# Patient Record
Sex: MALE | Race: BLACK OR AFRICAN AMERICAN | Employment: UNEMPLOYED | URBAN - METROPOLITAN AREA
[De-identification: names, ages, dates, MRNs, and addresses within clinical notes are randomized per-mention and may not be internally consistent; named-entity substitution may affect disease eponyms.]

---

## 2017-01-19 ENCOUNTER — HOSPITAL ENCOUNTER (EMERGENCY)
Age: 22
Discharge: HOME | End: 2017-01-19
Attending: EMERGENCY MEDICINE
Payer: SELF-PAY

## 2017-01-19 VITALS
TEMPERATURE: 98.1 F | OXYGEN SATURATION: 98 % | RESPIRATION RATE: 16 BRPM | WEIGHT: 250.44 LBS | HEART RATE: 65 BPM | BODY MASS INDEX: 33.97 KG/M2 | DIASTOLIC BLOOD PRESSURE: 80 MMHG | SYSTOLIC BLOOD PRESSURE: 141 MMHG

## 2017-01-19 DIAGNOSIS — S16.1XXA CERVICAL STRAIN, INITIAL ENCOUNTER: Primary | ICD-10-CM
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
## 2017-01-19 DIAGNOSIS — S39.012A LUMBAR STRAIN, INITIAL ENCOUNTER: ICD-10-CM

## 2017-01-19 PROCEDURE — 99282 EMERGENCY DEPT VISIT SF MDM: CPT

## 2017-01-19 RX ORDER — CYCLOBENZAPRINE HCL 10 MG
10 TABLET ORAL
Qty: 20 TAB | Refills: 0 | Status: SHIPPED | OUTPATIENT
Start: 2017-01-19 | End: 2017-04-24

## 2017-01-19 RX ORDER — HYDROCODONE BITARTRATE AND ACETAMINOPHEN 5; 325 MG/1; MG/1
1 TABLET ORAL
Qty: 10 TAB | Refills: 0 | Status: SHIPPED | OUTPATIENT
Start: 2017-01-19 | End: 2017-04-24

## 2017-01-19 RX ORDER — NAPROXEN 500 MG/1
500 TABLET ORAL 2 TIMES DAILY WITH MEALS
Qty: 20 TAB | Refills: 0 | Status: SHIPPED | OUTPATIENT
Start: 2017-01-19 | End: 2017-01-29
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
## 2017-04-24 ENCOUNTER — HOSPITAL ENCOUNTER (EMERGENCY)
Age: 22
Discharge: HOME | End: 2017-04-24
Attending: EMERGENCY MEDICINE
Payer: SELF-PAY

## 2017-04-24 ENCOUNTER — APPOINTMENT (OUTPATIENT)
Dept: GENERAL RADIOLOGY | Age: 22
End: 2017-04-24
Attending: EMERGENCY MEDICINE
Payer: SELF-PAY

## 2017-04-24 VITALS
DIASTOLIC BLOOD PRESSURE: 81 MMHG | OXYGEN SATURATION: 91 % | BODY MASS INDEX: 34.19 KG/M2 | HEART RATE: 60 BPM | HEIGHT: 73 IN | SYSTOLIC BLOOD PRESSURE: 138 MMHG | TEMPERATURE: 97.9 F | WEIGHT: 257.94 LBS | RESPIRATION RATE: 16 BRPM

## 2017-04-24 DIAGNOSIS — M79.672 ACUTE FOOT PAIN, LEFT: ICD-10-CM
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
## 2017-04-24 PROCEDURE — 73630 X-RAY EXAM OF FOOT: CPT

## 2017-04-24 PROCEDURE — 99284 EMERGENCY DEPT VISIT MOD MDM: CPT

## 2017-04-24 PROCEDURE — 74011250637 HC RX REV CODE- 250/637: Performed by: EMERGENCY MEDICINE

## 2017-04-24 RX ORDER — IBUPROFEN 600 MG/1
600 TABLET ORAL
Status: COMPLETED | OUTPATIENT
Start: 2017-04-24 | End: 2017-04-24

## 2017-04-24 RX ORDER — IBUPROFEN 600 MG/1
TABLET ORAL
Status: DISPENSED
Start: 2017-04-24 | End: 2017-04-24

## 2017-04-24 RX ORDER — OXYCODONE AND ACETAMINOPHEN 5; 325 MG/1; MG/1
2 TABLET ORAL
Status: DISCONTINUED | OUTPATIENT
Start: 2017-04-24 | End: 2017-04-24

## 2017-04-24 RX ADMIN — IBUPROFEN 600 MG: 600 TABLET, FILM COATED ORAL at 01:08

## 2017-04-24 NOTE — DISCHARGE INSTRUCTIONS
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
## 2017-04-24 NOTE — ED NOTES
ACE wrap placed on pt's left foot. CSM intact. Pt provided with crutches and educated on use. Pt ambulatory out of department with cruthes; declined wheelchair. Dr. Elkin Robison reviewed discharge instructions with the patient. The patient verbalized understanding.

## 2017-05-17 ENCOUNTER — HOSPITAL ENCOUNTER (EMERGENCY)
Age: 22
Discharge: HOME | End: 2017-05-17
Attending: EMERGENCY MEDICINE
Payer: SELF-PAY

## 2017-05-17 VITALS
RESPIRATION RATE: 16 BRPM | BODY MASS INDEX: 33.13 KG/M2 | HEIGHT: 73 IN | HEART RATE: 72 BPM | DIASTOLIC BLOOD PRESSURE: 86 MMHG | SYSTOLIC BLOOD PRESSURE: 149 MMHG | OXYGEN SATURATION: 99 % | WEIGHT: 250 LBS | TEMPERATURE: 98.4 F

## 2017-05-17 DIAGNOSIS — R07.89 MUSCULOSKELETAL CHEST PAIN: Primary | ICD-10-CM

## 2017-05-17 PROCEDURE — 93005 ELECTROCARDIOGRAM TRACING: CPT

## 2017-05-17 PROCEDURE — 99283 EMERGENCY DEPT VISIT LOW MDM: CPT

## 2017-05-17 NOTE — LETTER
[address] 
[facility] EMERGENCY DEPT 
[address] [place] [address] [id number] [phone] Work/School Note Date: 5/17/2017 To Whom It May concern: 
 
Dakotah Tobin was seen and treated today in the emergency room by the following provider(s): 
Attending Provider: Tyson Ibanez MD. Jordan Hardy may return to work on 5/18/17.  
 
Sincerely, 
 
 
 
 
Tyson Ibanez MD

## 2017-05-18 LAB
ATRIAL RATE: 57 BPM
CALCULATED P AXIS, ECG09: 53 DEGREES
CALCULATED R AXIS, ECG10: 37 DEGREES
CALCULATED T AXIS, ECG11: 19 DEGREES
DIAGNOSIS, 93000: NORMAL
P-R INTERVAL, ECG05: 142 MS
Q-T INTERVAL, ECG07: 378 MS
QRS DURATION, ECG06: 98 MS
QTC CALCULATION (BEZET), ECG08: 367 MS
VENTRICULAR RATE, ECG03: 57 BPM

## 2017-05-18 NOTE — ED PROVIDER NOTES
HPI Comments: Dayo Bassett is a 24 y.o. male with PMhx who presents ambulatory to the ED with cc of dull chest pain and SOB beginning 5/16/2017. He states his chest pain is mid-sternal, does not radiate, and is worsened by movement, lifting and deep inspiration. Pt also reports his SOB is exacerbated with movement and exertion, specifically while at work. He notes his symptoms began while at work, where he frequently lifts objects weighing 10-75 lbs. Pt endorses taking BC powder for his current symptoms, as he takes one every day prior to work due to the strenuous requirements. He denies any recent prolonged immobilization or recent surgery. Pt reports he is otherwise healthy and denies any long standing illnesses and medications. Pt specifically denies any cough and leg swelling. PCP: Murali Ramos MD    There are no other complaints, changes or physical findings at this time. The history is provided by the patient. History reviewed. No pertinent past medical history. History reviewed. No pertinent surgical history. History reviewed. No pertinent family history. Social History     Social History    Marital status: SINGLE     Spouse name: N/A    Number of children: N/A    Years of education: N/A     Occupational History    Not on file. Social History Main Topics    Smoking status: Former Smoker     Quit date: 5/3/2017    Smokeless tobacco: Not on file      Comment: Smokes cigars     Alcohol use No    Drug use: No    Sexual activity: Yes     Birth control/ protection: Condom     Other Topics Concern    Not on file     Social History Narrative         ALLERGIES: Pcn [penicillins] and Tomato    Review of Systems   Constitutional: Negative for chills, diaphoresis, fever and unexpected weight change. HENT: Negative for rhinorrhea and sore throat. Eyes: Negative for pain. Respiratory: Positive for shortness of breath. Negative for cough, wheezing and stridor. Cardiovascular: Positive for chest pain (mid-sternal). Negative for leg swelling. Gastrointestinal: Negative for abdominal pain, blood in stool, diarrhea, nausea and vomiting. Genitourinary: Negative for difficulty urinating, dysuria and flank pain. Musculoskeletal: Negative for back pain and neck stiffness. Skin: Negative for rash. Neurological: Negative for seizures, syncope, weakness, light-headedness and headaches. Psychiatric/Behavioral: Negative for confusion. Patient Vitals for the past 12 hrs:   Temp Pulse Resp BP SpO2   05/17/17 2130 - - - 149/86 99 %   05/17/17 2115 - - - 145/59 98 %   05/17/17 2044 98.4 °F (36.9 °C) 72 16 (!) 161/94 100 %            Physical Exam   Constitutional: He is oriented to person, place, and time. He appears well-developed and well-nourished. No distress. HENT:   Nose: Nose normal.   Mouth/Throat: Oropharynx is clear and moist. No oropharyngeal exudate. Eyes: Conjunctivae and EOM are normal. Pupils are equal, round, and reactive to light. Right eye exhibits no discharge. Left eye exhibits no discharge. No scleral icterus. Neck: Normal range of motion. Neck supple. No JVD present. Cardiovascular: Normal rate, regular rhythm, normal heart sounds and intact distal pulses. No murmur heard. Pulmonary/Chest: Effort normal and breath sounds normal. No stridor. No respiratory distress. He has no wheezes. He has no rales. Reproducible tenderness to palpation of sternum   Abdominal: Soft. Bowel sounds are normal. He exhibits no distension. There is no tenderness. There is no rebound and no guarding. Musculoskeletal: He exhibits no edema or tenderness. Neurological: He is alert and oriented to person, place, and time. Skin: Skin is warm and dry. He is not diaphoretic. Psychiatric: He has a normal mood and affect. Nursing note and vitals reviewed.        MDM  Number of Diagnoses or Management Options  Musculoskeletal chest pain:   Diagnosis management comments: Reproducible CP in pt who lifts heavy objects regularly at work. PERC negative. Most likely musculoskeletal. Low suspicion for ACS. Stable for discharge. Amount and/or Complexity of Data Reviewed  Review and summarize past medical records: yes    Patient Progress  Patient progress: stable    ED Course       Procedures    EKG interpretation: (Preliminary)  8:50 PM  Rhythm: sinus bradycardia; and regular . Rate (approx.): 57; Axis: normal; CO interval: normal; QRS interval: normal ; ST/T wave: normal;     LABORATORY TESTS:  Recent Results (from the past 12 hour(s))   EKG, 12 LEAD, INITIAL    Collection Time: 05/17/17  8:50 PM   Result Value Ref Range    Ventricular Rate 57 BPM    Atrial Rate 57 BPM    P-R Interval 142 ms    QRS Duration 98 ms    Q-T Interval 378 ms    QTC Calculation (Bezet) 367 ms    Calculated P Axis 53 degrees    Calculated R Axis 37 degrees    Calculated T Axis 19 degrees    Diagnosis       Sinus bradycardia with sinus arrhythmia  No previous ECGs available         IMPRESSION:  1. Musculoskeletal chest pain        PLAN:  1. Discharge home  2. Follow-up Information     Follow up With Details Comments Contact Info    Arely Ramirez MD Call As needed Mark Torres [address]  [address] [place] [address]  [phone]          Return to ED if worse     DISCHARGE NOTE:  9:41 PM  The patient is ready for discharge. The patients signs, symptoms, diagnosis, and instructions for discharge have been discussed and the pt has conveyed their understanding. The patient is to follow up as recommended with PCP or return to the ER should their symptoms worsen. Plan has been discussed and patient has conveyed their agreement. This note is prepared by Cinthya Soriano, acting as Scribe for Thomas Aguilera MD.    Thomas Aguilera MD.: The scribe's documentation has been prepared under my direction and personally reviewed by me in its entirety.  I confirm that the note above accurately reflects all work, treatment, procedures, and medical decision making performed by me.

## 2017-05-18 NOTE — ED NOTES
Patient received discharge instructions and questions were answered by ED MD Antonio Del Valle. Respirations even and unlabored, no signs or symptoms of cardiac distress noted at this time. Any wants or needs verbalized have been addressed to the best of our ability. Patient ambulated from ED with discharge instructions in hand. Wheelchair offered and declined by pt, educated patient on policy of discharge by wheelchair, verbalized understanding.

## 2017-05-18 NOTE — DISCHARGE INSTRUCTIONS
Musculoskeletal Chest Pain: Care Instructions  Your Care Instructions  Chest pain is not always a sign that something is wrong with your heart or that you have another serious problem. The doctor thinks your chest pain is caused by strained muscles or ligaments, inflamed chest cartilage, or another problem in your chest, rather than by your heart. You may need more tests to find the cause of your chest pain. Follow-up care is a key part of your treatment and safety. Be sure to make and go to all appointments, and call your doctor if you are having problems. Its also a good idea to know your test results and keep a list of the medicines you take. How can you care for yourself at home? · Take pain medicines exactly as directed. ¨ If the doctor gave you a prescription medicine for pain, take it as prescribed. ¨ If you are not taking a prescription pain medicine, ask your doctor if you can take an over-the-counter medicine. · Rest and protect the sore area. · Stop, change, or take a break from any activity that may be causing your pain or soreness. · Put ice or a cold pack on the sore area for 10 to 20 minutes at a time. Try to do this every 1 to 2 hours for the next 3 days (when you are awake) or until the swelling goes down. Put a thin cloth between the ice and your skin. · After 2 or 3 days, apply a heating pad set on low or a warm cloth to the area that hurts. Some doctors suggest that you go back and forth between hot and cold. · Do not wrap or tape your ribs for support. This may cause you to take smaller breaths, which could increase your risk of lung problems. · Mentholated creams such as Bengay or Icy Hot may soothe sore muscles. Follow the instructions on the package. · Follow your doctor's instructions for exercising. · Gentle stretching and massage may help you get better faster. Stretch slowly to the point just before pain begins, and hold the stretch for at least 15 to 30 seconds.  Do this 3 or 4 times a day. Stretch just after you have applied heat. · As your pain gets better, slowly return to your normal activities. Any increased pain may be a sign that you need to rest a while longer. When should you call for help? Call 911 anytime you think you may need emergency care. For example, call if:  · You have chest pain or pressure. This may occur with:  ¨ Sweating. ¨ Shortness of breath. ¨ Nausea or vomiting. ¨ Pain that spreads from the chest to the neck, jaw, or one or both shoulders or arms. ¨ Dizziness or lightheadedness. ¨ A fast or uneven pulse. After calling 911, chew 1 adult-strength aspirin. Wait for an ambulance. Do not try to drive yourself. · You have sudden chest pain and shortness of breath, or you cough up blood. Call your doctor now or seek immediate medical care if:  · You have any trouble breathing. · Your chest pain gets worse. · Your chest pain occurs consistently with exercise and is relieved by rest.  Watch closely for changes in your health, and be sure to contact your doctor if:  · Your chest pain does not get better after 1 week. Where can you learn more? Go to http://trinidad-gerard.info/. Enter V293 in the search box to learn more about \"Musculoskeletal Chest Pain: Care Instructions. \"  Current as of: May 27, 2016  Content Version: 11.2  © 6865-6283 [employer]. Care instructions adapted under license by [employer] (which disclaims liability or warranty for this information). If you have questions about a medical condition or this instruction, always ask your healthcare professional. [place] [address] any warranty or liability for your use of this information.

## 2017-05-18 NOTE — ED NOTES
Patient received from triage with c/o chest pain that started last night while at work. 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 States he had to work today but his boss wanted him to come in and get checked out. States worse with movement and better when drinking water. Resting in position of comfort with call bell in reach, monitor x 2.

## 2019-11-24 ENCOUNTER — HOSPITAL ENCOUNTER (EMERGENCY)
Age: 24
Discharge: HOME | End: 2019-11-24
Attending: EMERGENCY MEDICINE
Payer: SELF-PAY

## 2019-11-24 VITALS
WEIGHT: 257.28 LBS | HEART RATE: 77 BPM | OXYGEN SATURATION: 100 % | HEIGHT: 73 IN | SYSTOLIC BLOOD PRESSURE: 131 MMHG | TEMPERATURE: 98 F | BODY MASS INDEX: 34.1 KG/M2 | RESPIRATION RATE: 18 BRPM | DIASTOLIC BLOOD PRESSURE: 99 MMHG
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
## 2019-11-24 DIAGNOSIS — S39.012A STRAIN OF LUMBAR REGION, INITIAL ENCOUNTER: ICD-10-CM

## 2019-11-24 DIAGNOSIS — S09.90XA CLOSED HEAD INJURY, INITIAL ENCOUNTER: ICD-10-CM

## 2019-11-24 PROCEDURE — 99282 EMERGENCY DEPT VISIT SF MDM: CPT

## 2019-11-24 PROCEDURE — 74011250637 HC RX REV CODE- 250/637: Performed by: EMERGENCY MEDICINE

## 2019-11-24 RX ORDER — NAPROXEN 500 MG/1
500 TABLET ORAL
Qty: 20 TAB | Refills: 0 | Status: SHIPPED | OUTPATIENT
Start: 2019-11-24

## 2019-11-24 RX ORDER — NAPROXEN 250 MG/1
500 TABLET ORAL
Status: COMPLETED | OUTPATIENT
Start: 2019-11-24 | End: 2019-11-24

## 2019-11-24 RX ADMIN — NAPROXEN 500 MG: 250 TABLET ORAL at 03:06
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
## 2019-11-24 NOTE — ED NOTES
Patient was provided with discharge instructions. Instructions and any medications were reviewed with the patient &/or family by the provider. Questions and concerns addressed by the provider. Patient was ambulatory out of the ED and was escorted by friends.

## 2024-05-21 ENCOUNTER — APPOINTMENT (OUTPATIENT)
Facility: HOSPITAL | Age: 29
End: 2024-05-21
Payer: MEDICAID

## 2024-05-21 ENCOUNTER — HOSPITAL ENCOUNTER (EMERGENCY)
Facility: HOSPITAL | Age: 29
Discharge: HOME | End: 2024-05-21
Attending: EMERGENCY MEDICINE
Payer: MEDICAID

## 2024-05-21 VITALS
HEIGHT: 73 IN | OXYGEN SATURATION: 100 % | DIASTOLIC BLOOD PRESSURE: 110 MMHG | WEIGHT: 263 LBS | BODY MASS INDEX: 34.85 KG/M2 | RESPIRATION RATE: 20 BRPM | TEMPERATURE: 98.2 F | SYSTOLIC BLOOD PRESSURE: 164 MMHG | HEART RATE: 90 BPM

## 2024-05-21 DIAGNOSIS — S93.401A SPRAIN OF RIGHT ANKLE, UNSPECIFIED LIGAMENT, INITIAL ENCOUNTER: Primary | ICD-10-CM

## 2024-05-21 PROCEDURE — 99283 EMERGENCY DEPT VISIT LOW MDM: CPT

## 2024-05-21 PROCEDURE — 6370000000 HC RX 637 (ALT 250 FOR IP): Performed by: EMERGENCY MEDICINE

## 2024-05-21 PROCEDURE — 73610 X-RAY EXAM OF ANKLE: CPT

## 2024-05-21 RX ORDER — OXYCODONE HYDROCHLORIDE AND ACETAMINOPHEN 5; 325 MG/1; MG/1
1 TABLET ORAL
Status: COMPLETED | OUTPATIENT
Start: 2024-05-21 | End: 2024-05-21

## 2024-05-21 RX ORDER — NAPROXEN 500 MG/1
500 TABLET ORAL 2 TIMES DAILY
Qty: 60 TABLET | Refills: 0 | Status: SHIPPED | OUTPATIENT
Start: 2024-05-21

## 2024-05-21 RX ADMIN — OXYCODONE HYDROCHLORIDE AND ACETAMINOPHEN 1 TABLET: 5; 325 TABLET ORAL at 04:04

## 2024-05-21 ASSESSMENT — ENCOUNTER SYMPTOMS
VOMITING: 0
NAUSEA: 0
ABDOMINAL PAIN: 0
DIARRHEA: 0
SHORTNESS OF BREATH: 0

## 2024-05-21 ASSESSMENT — PAIN SCALES - GENERAL: PAINLEVEL_OUTOF10: 10

## 2024-05-21 NOTE — ED PROVIDER NOTES
EMERGENCY DEPARTMENT HISTORY AND PHYSICAL EXAM     ----------------------------------------------------------------------------  Please note that this dictation was completed with IRI, the computer voice recognition software.  Quite often unanticipated grammatical, syntax, homophones, and other interpretive errors are inadvertently transcribed by the computer software.  Please disregard these errors.  Please excuse any errors that have escaped final proofreading  ----------------------------------------------------------------------------      Date: 2024  Patient Name: Faizan Andrews      HISTORY OF PRESENT ILLNESS     Chief Complaint   Patient presents with    Ankle Pain     Pt arrives w cc of right ankle injury after falling while playing basketball. Significant swelling noted       History obtainted from:  Patient    Other independent source of history: family    HPI: Faizan Andrews is a 28 y.o. male, with significant pmhx, who presents via private vehicle  to the ED with c/o right ankle pain that occurred after rolling his ankle inwardly while playing bass well earlier this evening.  Took BC powder without relief of his symptoms and noted to have increased swelling throughout the evening, prompting him to come in for further evaluation.  Patient notes that is more painful with ambulation.        PCP: No primary care provider on file.    Allergy List:   Allergies   Allergen Reactions    Penicillins     Tomato          CURRENT MEDICATIONS      Previous Medications    No medications on file         PAST HISTORY       Past Medical History:  No past medical history on file.    Past Surgical History:  No past surgical history on file.    Family History:  No family history on file.    Social History:  Social History     Tobacco Use    Smoking status: Former     Current packs/day: 0.00     Types: Cigarettes     Quit date: 5/3/2017     Years since quittin.0    Tobacco comments:     Quit smoking: Smokes

## 2024-12-20 NOTE — ED NOTES
Labs look good.  Continue a low-cholesterol diet and aerobic activity.  Continue current meds.  Recheck CMP and lipid panel in 6 months and rest of labs in 1 year. Pt placed in boot by this RN and given dc papers.